# Patient Record
(demographics unavailable — no encounter records)

---

## 2024-10-14 NOTE — REVIEW OF SYSTEMS
[Negative] : Musculoskeletal [FreeTextEntry3] : per HPI [FreeTextEntry8] : per HPI [FreeTextEntry9] : per HPI [de-identified] : per HPI [de-identified] : per HPI

## 2024-10-14 NOTE — HISTORY OF PRESENT ILLNESS
[Date: ___] : Date of most recent diagnostic polysomnogram: [unfilled] [AHI: ___ per hour] : Apnea-hypopnea index:  [unfilled] per hour [T90%: ___] : T90%: [unfilled]% [FreeTextEntry1] : Hx HTN, MO.  Snoring, STOPBANG 6. No EDS, no nonrestorative sleep, no witnessed apneas.   Had PSG 9/2024 which showed mod MIRELLA.  Retired [employer].  [Daytime Somnolence] : no daytime somnolence [ESS] : 4

## 2024-10-14 NOTE — PLAN
[TextEntry] : Reviewed treatments for sleep apnea - reviewed CPAP as first line therapy; reviewed OA, surgical options mainly Inspire. Will try CPAP. He does not want a titration study. Will do APAP. His wife has a FFM and would like to try a FFM; reviewed choices. Will try AirFit F30i. Reviewed use of CPAP.  Compliance goals. Reviewed short and long term health consequences. Weight loss. All questions answered.

## 2024-12-17 NOTE — ASSESSMENT
[FreeTextEntry1] : Moderate MIRELLA; needs trt. Hx HTN, obesity.  The patient is using CPAP well, is compliant with it's use and getting clinical benefit. The patient should continue to use CPAP.

## 2024-12-17 NOTE — REVIEW OF SYSTEMS
[Negative] : Musculoskeletal [FreeTextEntry3] : per HPI [FreeTextEntry8] : per HPI [FreeTextEntry9] : per HPI [de-identified] : per HPI [de-identified] : per HPI

## 2024-12-17 NOTE — HISTORY OF PRESENT ILLNESS
"Recommendations from today's MTM visit:                                                        1. I will see if we can put a \"banner\" on his chart to let the ED know that he needs to not get more than 5-10 cc of lactulose on first dose when coming into the ED or hospital.    2. Your hydroxyzine, mirtazapine, and trazodone are all likely contributing to daytime drowsiness. Hydroxyzine comes as 10mg so you could talk to your doctor about trying a lower dose. You could also try moving mirtazapine and trazodone up by 1-2 hours at night.    3. Glipizide, Bactrim, and trazodone may cause some sun sensitivity. Just wear a hat and sunscreen!    4. I will talk to Dr. Louis about whether or not we could increase your Trulicity.    Next MTM visit: I will call next week on Wednesday at 11am to see how you are doing    To schedule another MTM appointment, please call the clinic directly or you may call the MTM scheduling line at [phone] or toll-free at [phone].     My Clinical Pharmacist's contact information:                                                      It was a pleasure seeing you today!  Please feel free to contact me with any questions or concerns you have.      Felicia Torres, PharmD  Medication Therapy Management Provider  Phone:[phone]  Pager: [phone]    You may receive a survey about the MTM services you received.  I would appreciate your feedback to help me serve you better in the future. Please fill it out and return it when you can. Your comments will be anonymous.          " [Date: ___] : Date of most recent diagnostic polysomnogram: [unfilled] [AHI: ___ per hour] : Apnea-hypopnea index:  [unfilled] per hour [T90%: ___] : T90%: [unfilled]% [FreeTextEntry1] : Hx HTN, MO.  Snoring, STOPBANG 6. No EDS, no nonrestorative sleep, no witnessed apneas.   Had PSG 9/2024 which showed mod MIRELLA.  Doing well on APAP 5-20 with AirFit F30i mask.  Feels better using it. No snoring. Beginning to feel more energy. Compliance excellent. Therapeutic AHI WNL.   DME is [place].   Retired [employer].  [Daytime Somnolence] : no daytime somnolence [ESS] : 4

## 2025-05-29 NOTE — PLAN
[TextEntry] : Continue CPAP.   Reviewed use of CPAP.   Continued weight loss. Consider repeat sleep study when at goal weight. All questions answered.

## 2025-05-29 NOTE — HISTORY OF PRESENT ILLNESS
[Date: ___] : Date of most recent diagnostic polysomnogram: [unfilled] [AHI: ___ per hour] : Apnea-hypopnea index:  [unfilled] per hour [T90%: ___] : T90%: [unfilled]% [FreeTextEntry1] : Hx HTN, MO.  Snoring, STOPBANG 6. No EDS, no nonrestorative sleep, no witnessed apneas.   Had PSG 9/2024 which showed mod MIRELLA.  Doing well on APAP 5-20 with AirFit F30i mask.  Got it 11/4/24. Feels better using it. No snoring.  Less EDS. More energy. Compliance excellent. Therapeutic AHI WNL.   Did not use for a week while in [place].   On mounjaro. Lost significant weight.  DME is [place].   Retired [employer].  [Daytime Somnolence] : no daytime somnolence [CPAP: ___ cmH2O] : CPAP: [unfilled] cmH2O [% Days used > 4 hrs: ____] : Days used > 4 hrs: [unfilled] % [Therapy based AHI: ___ /hr] : Therapy based AHI: [unfilled] / hr [ESS] : 4

## 2025-05-29 NOTE — REVIEW OF SYSTEMS
[Negative] : Musculoskeletal [FreeTextEntry3] : per HPI [FreeTextEntry8] : per HPI [FreeTextEntry9] : per HPI [de-identified] : per HPI [de-identified] : per HPI